# Patient Record
Sex: FEMALE | URBAN - METROPOLITAN AREA
[De-identification: names, ages, dates, MRNs, and addresses within clinical notes are randomized per-mention and may not be internally consistent; named-entity substitution may affect disease eponyms.]

---

## 2022-10-26 ENCOUNTER — APPOINTMENT (RX ONLY)
Dept: URBAN - METROPOLITAN AREA CLINIC 12 | Facility: CLINIC | Age: 67
Setting detail: DERMATOLOGY
End: 2022-10-26

## 2022-10-26 DIAGNOSIS — L90.0 LICHEN SCLEROSUS ET ATROPHICUS: ICD-10-CM | Status: INADEQUATELY CONTROLLED

## 2022-10-26 PROBLEM — L30.9 DERMATITIS, UNSPECIFIED: Status: ACTIVE | Noted: 2022-10-26

## 2022-10-26 PROCEDURE — 11104 PUNCH BX SKIN SINGLE LESION: CPT

## 2022-10-26 PROCEDURE — ? BIOPSY BY PUNCH METHOD

## 2022-10-26 PROCEDURE — ? COUNSELING

## 2022-10-26 ASSESSMENT — LOCATION DETAILED DESCRIPTION DERM
LOCATION DETAILED: LEFT MEDIAL BREAST 9-10:00 REGION
LOCATION DETAILED: PERIUMBILICAL SKIN
LOCATION DETAILED: INFERIOR LUMBAR SPINE
LOCATION DETAILED: RIGHT LATERAL ABDOMEN
LOCATION DETAILED: RIGHT INFERIOR MEDIAL MIDBACK
LOCATION DETAILED: PERIUMBILICAL SKIN
LOCATION DETAILED: SUPRAPUBIC SKIN
LOCATION DETAILED: LEFT LATERAL ABDOMEN

## 2022-10-26 ASSESSMENT — LOCATION SIMPLE DESCRIPTION DERM
LOCATION SIMPLE: LOWER BACK
LOCATION SIMPLE: GROIN
LOCATION SIMPLE: LEFT BREAST
LOCATION SIMPLE: ABDOMEN
LOCATION SIMPLE: RIGHT LOWER BACK
LOCATION SIMPLE: ABDOMEN

## 2022-10-26 ASSESSMENT — LOCATION ZONE DERM
LOCATION ZONE: TRUNK
LOCATION ZONE: TRUNK

## 2022-10-26 NOTE — HPI: RASH
What Type Of Note Output Would You Prefer (Optional)?: Bullet Format
How Severe Is Your Rash?: moderate
Is This A New Presentation, Or A Follow-Up?: Rash
Additional History: Declines chaperone \\n\\nRash on abdomen.  Splotchy an bumpy sometimes itchy.   - dry.

## 2023-03-29 ENCOUNTER — APPOINTMENT (RX ONLY)
Dept: URBAN - METROPOLITAN AREA CLINIC 12 | Facility: CLINIC | Age: 68
Setting detail: DERMATOLOGY
End: 2023-03-29

## 2023-03-29 DIAGNOSIS — L94.0 LOCALIZED SCLERODERMA [MORPHEA]: ICD-10-CM

## 2023-03-29 PROBLEM — L30.9 DERMATITIS, UNSPECIFIED: Status: ACTIVE | Noted: 2023-03-29

## 2023-03-29 PROCEDURE — ? ORDER TESTS

## 2023-03-29 PROCEDURE — ? COUNSELING

## 2023-03-29 PROCEDURE — 99213 OFFICE O/P EST LOW 20 MIN: CPT

## 2023-03-29 PROCEDURE — ? PRESCRIPTION

## 2023-03-29 PROCEDURE — ? ADDITIONAL NOTES

## 2023-03-29 RX ORDER — TACROLIMUS 1 MG/G
OINTMENT TOPICAL
Qty: 100 | Refills: 4 | Status: ERX | COMMUNITY
Start: 2023-03-29

## 2023-03-29 RX ORDER — CLOBETASOL PROPIONATE 0.5 MG/G
CREAM TOPICAL BID
Qty: 60 | Refills: 3 | Status: ERX | COMMUNITY
Start: 2023-03-29

## 2023-03-29 RX ADMIN — CLOBETASOL PROPIONATE: 0.5 CREAM TOPICAL at 00:00

## 2023-03-29 RX ADMIN — TACROLIMUS: 1 OINTMENT TOPICAL at 00:00

## 2023-03-29 NOTE — PROCEDURE: ORDER TESTS
Bill For Surgical Tray: no
Expected Date Of Service: 03/29/2023
Performing Laboratory: 0
Billing Type: Third-Party Bill

## 2023-03-29 NOTE — HPI: EVALUATION OF SKIN LESION(S)
What Type Of Note Output Would You Prefer (Optional)?: Bullet Format
Hpi Title: Evaluation of Skin Lesions
How Severe Are Your Spot(S)?: mild
Additional History: Fbe \\nPt presents for previous appointment - rash like - has migrated - itchy

## 2023-03-29 NOTE — PROCEDURE: ADDITIONAL NOTES
Detail Level: Simple
Render Risk Assessment In Note?: no
Additional Notes: ?? pt has lumps on left breast?? going to see heme/onc and get 20 radiation treatments\\nPath showed:  Post-inflammatory pigmentary alteration\\nNOTE: This is the end stage of an inflammatory process. There is no hint as to what \\nprocess preceded this change.

## 2023-06-22 ENCOUNTER — APPOINTMENT (RX ONLY)
Dept: URBAN - METROPOLITAN AREA CLINIC 12 | Facility: CLINIC | Age: 68
Setting detail: DERMATOLOGY
End: 2023-06-22

## 2023-06-22 DIAGNOSIS — L94.0 LOCALIZED SCLERODERMA [MORPHEA]: ICD-10-CM

## 2023-06-22 PROBLEM — L30.9 DERMATITIS, UNSPECIFIED: Status: ACTIVE | Noted: 2023-06-22

## 2023-06-22 PROCEDURE — ? PRESCRIPTION

## 2023-06-22 PROCEDURE — ? ADDITIONAL NOTES

## 2023-06-22 PROCEDURE — ? BIOPSY BY PUNCH METHOD

## 2023-06-22 PROCEDURE — 11105 PUNCH BX SKIN EA SEP/ADDL: CPT

## 2023-06-22 PROCEDURE — ? COUNSELING

## 2023-06-22 PROCEDURE — 11104 PUNCH BX SKIN SINGLE LESION: CPT

## 2023-06-22 PROCEDURE — ? ORDER TESTS

## 2023-06-22 RX ORDER — TACROLIMUS 1 MG/G
OINTMENT TOPICAL
Qty: 100 | Refills: 4 | Status: ACTIVE

## 2023-06-22 RX ORDER — CLOBETASOL PROPIONATE 0.5 MG/G
CREAM TOPICAL BID
Qty: 60 | Refills: 3 | Status: ACTIVE

## 2023-06-22 RX ORDER — CALCIPOTRIENE 0.05 MG/G
CREAM TOPICAL
Qty: 120 | Refills: 2 | Status: ERX | COMMUNITY
Start: 2023-06-22

## 2023-06-22 RX ADMIN — CALCIPOTRIENE: 0.05 CREAM TOPICAL at 00:00

## 2023-06-22 ASSESSMENT — LOCATION DETAILED DESCRIPTION DERM
LOCATION DETAILED: LEFT LATERAL ABDOMEN
LOCATION DETAILED: RIGHT LATERAL ABDOMEN

## 2023-06-22 ASSESSMENT — LOCATION SIMPLE DESCRIPTION DERM: LOCATION SIMPLE: ABDOMEN

## 2023-06-22 ASSESSMENT — LOCATION ZONE DERM: LOCATION ZONE: TRUNK

## 2023-06-22 NOTE — PROCEDURE: BIOPSY BY PUNCH METHOD
Detail Level: Detailed
Was A Bandage Applied: Yes
Punch Size In Mm: 4
Size Of Lesion In Cm (Optional): 0
Depth Of Punch Biopsy: dermis
Biopsy Type: H and E
Anesthesia Type: 1% lidocaine with epinephrine
Anesthesia Volume In Cc (Will Not Render If 0): 0.5
Hemostasis: None
Epidermal Sutures: 5-0 Vicryl
Wound Care: Petrolatum
Dressing: bandage
Suture Removal: 10 days
Patient Will Remove Sutures At Home?: No
Lab: -101
Lab Facility: [id number]
Path Notes (To The Dermatopathologist): Check margins
Consent: Verbal consent was obtained and risks were reviewed including but not limited to scarring, infection, bleeding, scabbing, incomplete removal, nerve damage and allergy to anesthesia.
Post-Care Instructions: I reviewed with the patient in detail post-care instructions. Patient is to keep the biopsy site dry overnight, and then apply bacitracin twice daily until healed. Patient may apply hydrogen peroxide soaks to remove any crusting.
Home Suture Removal Text: Patient was provided a home suture removal kit and will remove their sutures at home.  If they have any questions or difficulties they will call the office.
Notification Instructions: Patient will be notified of biopsy results. However, patient instructed to call the office if not contacted within 2 weeks.
Billing Type: Third-Party Bill
Information: Selecting Yes will display possible errors in your note based on the variables you have selected. This validation is only offered as a suggestion for you. PLEASE NOTE THAT THE VALIDATION TEXT WILL BE REMOVED WHEN YOU FINALIZE YOUR NOTE. IF YOU WANT TO FAX A PRELIMINARY NOTE YOU WILL NEED TO TOGGLE THIS TO 'NO' IF YOU DO NOT WANT IT IN YOUR FAXED NOTE.

## 2023-06-22 NOTE — PROCEDURE: ADDITIONAL NOTES
Detail Level: Simple
Render Risk Assessment In Note?: no
Additional Notes: pt has lumps on left breast, going to see heme/onc and get 20 radiation treatments\\nPath showed:  Post-inflammatory pigmentary alteration\\nNOTE: This is the end stage of an inflammatory process. There is no hint as to what \\nprocess preceded this change.